# Patient Record
(demographics unavailable — no encounter records)

---

## 2024-10-08 NOTE — HISTORY OF PRESENT ILLNESS
[FreeTextEntry8] : Pt complains of a severe headache starting since yesterday. Pt left work early yesterday due to the pain. 7/10 pain now and 10/10 pain yesterday. Has taken Aleve 3 times since yesterday. Pt believes it is stress related due to her job. Changing her job location. Took off work today. Headache is "all over her head and in the back " Vision is normal. No dizziness. [Fully active, able to carry on all pre-disease performance without restriction] : Status 0 - Fully active, able to carry on all pre-disease performance without restriction [General Appearance - Alert] : alert [General Appearance - In No Acute Distress] : in no acute distress [Sclera] : the sclera and conjunctiva were normal [PERRL With Normal Accommodation] : pupils were equal in size, round, and reactive to light [Extraocular Movements] : extraocular movements were intact [Outer Ear] : the ears and nose were normal in appearance [Oropharynx] : the oropharynx was normal [Neck Appearance] : the appearance of the neck was normal [Neck Cervical Mass (___cm)] : no neck mass was observed [Jugular Venous Distention Increased] : there was no jugular-venous distention [Thyroid Diffuse Enlargement] : the thyroid was not enlarged [Thyroid Nodule] : there were no palpable thyroid nodules [Auscultation Breath Sounds / Voice Sounds] : lungs were clear to auscultation bilaterally [Heart Rate And Rhythm] : heart rate was normal and rhythm regular [Heart Sounds] : normal S1 and S2 [Heart Sounds Gallop] : no gallops [Murmurs] : no murmurs [Heart Sounds Pericardial Friction Rub] : no pericardial rub [Examination Of The Chest] : the chest was normal in appearance [Chest Visual Inspection Thoracic Asymmetry] : no chest asymmetry [Diminished Respiratory Excursion] : normal chest expansion [2+] : left 2+ [Breast Appearance] : normal in appearance [Breast Palpation Mass] : no palpable masses [Bowel Sounds] : normal bowel sounds [Abdomen Soft] : soft [Abdomen Tenderness] : non-tender [Abdomen Mass (___ Cm)] : no abdominal mass palpated [No CVA Tenderness] : no ~M costovertebral angle tenderness [No Spinal Tenderness] : no spinal tenderness [Abnormal Walk] : normal gait [Nail Clubbing] : no clubbing  or cyanosis of the fingernails [Musculoskeletal - Swelling] : no joint swelling seen [Motor Tone] : muscle strength and tone were normal [Skin Color & Pigmentation] : normal skin color and pigmentation [Skin Turgor] : normal skin turgor [] : no rash [Deep Tendon Reflexes (DTR)] : deep tendon reflexes were 2+ and symmetric [Sensation] : the sensory exam was normal to light touch and pinprick [No Focal Deficits] : no focal deficits [Oriented To Time, Place, And Person] : oriented to person, place, and time [Impaired Insight] : insight and judgment were intact [Affect] : the affect was normal [FreeTextEntry1] : Rt axillary lesion is resolving, likely infectious

## 2024-10-08 NOTE — REVIEW OF SYSTEMS
[Headache] : headache [Anxiety] : anxiety [Vision Problems] : no vision problems [Dizziness] : no dizziness

## 2024-10-08 NOTE — HEALTH RISK ASSESSMENT
[No] : No [No falls in past year] : Patient reported no falls in the past year [0] : 2) Feeling down, depressed, or hopeless: Not at all (0) [Never] : Never [PHQ-2 Negative - No further assessment needed] : PHQ-2 Negative - No further assessment needed [[id number]] : 0

## 2024-10-08 NOTE — PHYSICAL EXAM
[No Acute Distress] : no acute distress [Well Nourished] : well nourished [PERRL] : pupils equal round and reactive to light [No Edema] : there was no peripheral edema [Normal] : affect was normal and insight and judgment were intact [de-identified] : cervical and trapezius mm are tight

## 2024-10-14 NOTE — HISTORY OF PRESENT ILLNESS
[FreeTextEntry1] : Complete Physical Examination [de-identified] : 47-year-old  female presents for Complete Physical Examination. Being treated for no chronic medical conditions. Takes no chronic prescription medications. Feels well in general. No new symptoms. Non-smoker. No alcohol intake. Immunizations reviewed and are up to date. Refuses influenza immunization. All preventative services were reviewed in detail and appear to be current for age.

## 2024-10-14 NOTE — HEALTH RISK ASSESSMENT
[Good] : ~his/her~  mood as  good [No] : In the past 12 months have you used drugs other than those required for medical reasons? No [No falls in past year] : Patient reported no falls in the past year [0] : 2) Feeling down, depressed, or hopeless: Not at all (0) [PHQ-2 Negative - No further assessment needed] : PHQ-2 Negative - No further assessment needed [Never] : Never [NO] : No [No Retinopathy] : No retinopathy [Patient reported mammogram was normal] : Patient reported mammogram was normal [HIV test declined] : HIV test declined [Hepatitis C test declined] : Hepatitis C test declined [None] : None [With Family] : lives with family [# of Members in Household ___] :  household currently consist of [unfilled] member(s) [Employed] : employed [College] : College [] :  [# Of Children ___] : has [unfilled] children [Sexually Active] : sexually active [Feels Safe at Home] : Feels safe at home [Fully functional (bathing, dressing, toileting, transferring, walking, feeding)] : Fully functional (bathing, dressing, toileting, transferring, walking, feeding) [Fully functional (using the telephone, shopping, preparing meals, housekeeping, doing laundry, using] : Fully functional and needs no help or supervision to perform IADLs (using the telephone, shopping, preparing meals, housekeeping, doing laundry, using transportation, managing medications and managing finances) [Reports normal functional visual acuity (ie: able to read med bottle)] : Reports normal functional visual acuity [Smoke Detector] : smoke detector [Carbon Monoxide Detector] : carbon monoxide detector [Safety elements used in home] : safety elements used in home [Seat Belt] :  uses seat belt [Sunscreen] : uses sunscreen [Audit-CScore] : 0 [de-identified] : Walking [de-identified] : Balanced [[id number]] : 0 [EyeExamDate] : 06/24 [Change in mental status noted] : No change in mental status noted [Language] : denies difficulty with language [Behavior] : denies difficulty with behavior [Learning/Retaining New Information] : denies difficulty learning/retaining new information [Handling Complex Tasks] : denies difficulty handling complex tasks [Reasoning] : denies difficulty with reasoning [Spatial Ability and Orientation] : denies difficulty with spatial ability and orientation [High Risk Behavior] : no high risk behavior [Reports changes in hearing] : Reports no changes in hearing [Reports changes in vision] : Reports no changes in vision [Reports changes in dental health] : Reports no changes in dental health [Travel to Developing Areas] : does not  travel to developing areas [TB Exposure] : is not being exposed to tuberculosis [MammogramDate] : 03/22 [PapSmearDate] : 11/20 [BoneDensityComments] : NA [ColonoscopyComments] : Not done [de-identified] : Patient, wife and 2 children [FreeTextEntry2] :  [de-identified] : [employer] [FreeTextEntry3] : 2 sons [de-identified] : Glasses-distance [de-identified] : 06/2024

## 2024-10-14 NOTE — HISTORY OF PRESENT ILLNESS
[FreeTextEntry1] : Complete Physical Examination [de-identified] : 47-year-old  female presents for Complete Physical Examination. Being treated for no chronic medical conditions. Takes no chronic prescription medications. Feels well in general. No new symptoms. Non-smoker. No alcohol intake. Immunizations reviewed and are up to date. Refuses influenza immunization. All preventative services were reviewed in detail and appear to be current for age.

## 2024-10-14 NOTE — PHYSICAL EXAM
[No Acute Distress] : no acute distress [Well Nourished] : well nourished [Well Developed] : well developed [Well-Appearing] : well-appearing [Normal Voice/Communication] : normal voice/communication [Normal Sclera/Conjunctiva] : normal sclera/conjunctiva [PERRL] : pupils equal round and reactive to light [EOMI] : extraocular movements intact [Fundoscopic Exam Performed] : fundoscopic ~T exam ~C was performed [Normal Outer Ear/Nose] : the outer ears and nose were normal in appearance [Normal Oropharynx] : the oropharynx was normal [Normal TMs] : both tympanic membranes were normal [Normal Nasal Mucosa] : the nasal mucosa was normal [No JVD] : no jugular venous distention [No Lymphadenopathy] : no lymphadenopathy [Supple] : supple [Thyroid Normal, No Nodules] : the thyroid was normal and there were no nodules present [No Respiratory Distress] : no respiratory distress  [No Accessory Muscle Use] : no accessory muscle use [Clear to Auscultation] : lungs were clear to auscultation bilaterally [Normal Percussion] : the chest was normal to percussion [Normal Rate] : normal rate  [Regular Rhythm] : with a regular rhythm [Normal S1, S2] : normal S1 and S2 [No Murmur] : no murmur heard [No Carotid Bruits] : no carotid bruits [No Abdominal Bruit] : a ~M bruit was not heard ~T in the abdomen [No Varicosities] : no varicosities [Pedal Pulses Present] : the pedal pulses are present [No Edema] : there was no peripheral edema [No Palpable Aorta] : no palpable aorta [No Extremity Clubbing/Cyanosis] : no extremity clubbing/cyanosis [Normal Appearance] : normal in appearance [No Nipple Discharge] : no nipple discharge [No Axillary Lymphadenopathy] : no axillary lymphadenopathy [Soft] : abdomen soft [Non Tender] : non-tender [Non-distended] : non-distended [No Masses] : no abdominal mass palpated [No HSM] : no HSM [Normal Bowel Sounds] : normal bowel sounds [No Hernias] : no hernias [Normal Sphincter Tone] : normal sphincter tone [No Mass] : no mass [Anus Abnormality] : the anus and perineum were normal [Rectal Exam - Rectum] : the rectal exam was normal [Normal Supraclavicular Nodes] : no supraclavicular lymphadenopathy [Normal Axillary Nodes] : no axillary lymphadenopathy [Normal Posterior Cervical Nodes] : no posterior cervical lymphadenopathy [Normal Anterior Cervical Nodes] : no anterior cervical lymphadenopathy [Normal Inguinal Nodes] : no inguinal lymphadenopathy [Normal Femoral Nodes] : no femoral lymphadenopathy [No CVA Tenderness] : no CVA  tenderness [No Spinal Tenderness] : no spinal tenderness [No Joint Swelling] : no joint swelling [Grossly Normal Strength/Tone] : grossly normal strength/tone [No Rash] : no rash [No Skin Lesions] : no skin lesions [Coordination Grossly Intact] : coordination grossly intact [No Focal Deficits] : no focal deficits [Normal Gait] : normal gait [Deep Tendon Reflexes (DTR)] : deep tendon reflexes were 2+ and symmetric [Speech Grossly Normal] : speech grossly normal [Memory Grossly Normal] : memory grossly normal [Normal Affect] : the affect was normal [Alert and Oriented x3] : oriented to person, place, and time [Normal Mood] : the mood was normal [Normal Insight/Judgement] : insight and judgment were intact [Peripheral Vision Was Full To Confrontation Bilaterally] : visual fields were full to confrontation bilaterally [Peripheral Vision Was Full To Confrontation Right Eye] : visual fields were full to confrontation on the right [Peripheral Vision Was Full To Confrontation Left Eye] : visual fields were full to confrontation on the left [Stool Occult Blood] : stool negative for occult blood [Kyphosis] : no kyphosis [Scoliosis] : no scoliosis [Acne] : no acne

## 2024-10-14 NOTE — HEALTH RISK ASSESSMENT
[Good] : ~his/her~  mood as  good [No] : In the past 12 months have you used drugs other than those required for medical reasons? No [No falls in past year] : Patient reported no falls in the past year [0] : 2) Feeling down, depressed, or hopeless: Not at all (0) [PHQ-2 Negative - No further assessment needed] : PHQ-2 Negative - No further assessment needed [Never] : Never [NO] : No [No Retinopathy] : No retinopathy [Patient reported mammogram was normal] : Patient reported mammogram was normal [HIV test declined] : HIV test declined [Hepatitis C test declined] : Hepatitis C test declined [None] : None [With Family] : lives with family [# of Members in Household ___] :  household currently consist of [unfilled] member(s) [Employed] : employed [College] : College [] :  [# Of Children ___] : has [unfilled] children [Sexually Active] : sexually active [Feels Safe at Home] : Feels safe at home [Fully functional (bathing, dressing, toileting, transferring, walking, feeding)] : Fully functional (bathing, dressing, toileting, transferring, walking, feeding) [Fully functional (using the telephone, shopping, preparing meals, housekeeping, doing laundry, using] : Fully functional and needs no help or supervision to perform IADLs (using the telephone, shopping, preparing meals, housekeeping, doing laundry, using transportation, managing medications and managing finances) [Reports normal functional visual acuity (ie: able to read med bottle)] : Reports normal functional visual acuity [Smoke Detector] : smoke detector [Carbon Monoxide Detector] : carbon monoxide detector [Safety elements used in home] : safety elements used in home [Seat Belt] :  uses seat belt [Sunscreen] : uses sunscreen [Audit-CScore] : 0 [de-identified] : Walking [de-identified] : Balanced [[id number]] : 0 [EyeExamDate] : 06/24 [Change in mental status noted] : No change in mental status noted [Language] : denies difficulty with language [Behavior] : denies difficulty with behavior [Learning/Retaining New Information] : denies difficulty learning/retaining new information [Handling Complex Tasks] : denies difficulty handling complex tasks [Reasoning] : denies difficulty with reasoning [Spatial Ability and Orientation] : denies difficulty with spatial ability and orientation [High Risk Behavior] : no high risk behavior [Reports changes in hearing] : Reports no changes in hearing [Reports changes in vision] : Reports no changes in vision [Reports changes in dental health] : Reports no changes in dental health [Travel to Developing Areas] : does not  travel to developing areas [TB Exposure] : is not being exposed to tuberculosis [MammogramDate] : 03/22 [PapSmearDate] : 11/20 [BoneDensityComments] : NA [ColonoscopyComments] : Not done [de-identified] : Patient, wife and 2 children [FreeTextEntry2] :  [de-identified] : [employer] [FreeTextEntry3] : 2 sons [de-identified] : Glasses-distance [de-identified] : 06/2024

## 2024-10-14 NOTE — HISTORY OF PRESENT ILLNESS
[FreeTextEntry1] : Complete Physical Examination [de-identified] : 47-year-old  female presents for Complete Physical Examination. Being treated for no chronic medical conditions. Takes no chronic prescription medications. Feels well in general. No new symptoms. Non-smoker. No alcohol intake. Immunizations reviewed and are up to date. Refuses influenza immunization. All preventative services were reviewed in detail and appear to be current for age.

## 2024-10-14 NOTE — HEALTH RISK ASSESSMENT
[Good] : ~his/her~  mood as  good [No] : In the past 12 months have you used drugs other than those required for medical reasons? No [No falls in past year] : Patient reported no falls in the past year [0] : 2) Feeling down, depressed, or hopeless: Not at all (0) [PHQ-2 Negative - No further assessment needed] : PHQ-2 Negative - No further assessment needed [Never] : Never [NO] : No [No Retinopathy] : No retinopathy [Patient reported mammogram was normal] : Patient reported mammogram was normal [HIV test declined] : HIV test declined [Hepatitis C test declined] : Hepatitis C test declined [None] : None [With Family] : lives with family [# of Members in Household ___] :  household currently consist of [unfilled] member(s) [Employed] : employed [College] : College [] :  [# Of Children ___] : has [unfilled] children [Sexually Active] : sexually active [Feels Safe at Home] : Feels safe at home [Fully functional (bathing, dressing, toileting, transferring, walking, feeding)] : Fully functional (bathing, dressing, toileting, transferring, walking, feeding) [Fully functional (using the telephone, shopping, preparing meals, housekeeping, doing laundry, using] : Fully functional and needs no help or supervision to perform IADLs (using the telephone, shopping, preparing meals, housekeeping, doing laundry, using transportation, managing medications and managing finances) [Reports normal functional visual acuity (ie: able to read med bottle)] : Reports normal functional visual acuity [Smoke Detector] : smoke detector [Carbon Monoxide Detector] : carbon monoxide detector [Safety elements used in home] : safety elements used in home [Seat Belt] :  uses seat belt [Sunscreen] : uses sunscreen [Audit-CScore] : 0 [de-identified] : Walking [de-identified] : Balanced [[id number]] : 0 [EyeExamDate] : 06/24 [Change in mental status noted] : No change in mental status noted [Language] : denies difficulty with language [Behavior] : denies difficulty with behavior [Learning/Retaining New Information] : denies difficulty learning/retaining new information [Handling Complex Tasks] : denies difficulty handling complex tasks [Reasoning] : denies difficulty with reasoning [Spatial Ability and Orientation] : denies difficulty with spatial ability and orientation [High Risk Behavior] : no high risk behavior [Reports changes in hearing] : Reports no changes in hearing [Reports changes in vision] : Reports no changes in vision [Reports changes in dental health] : Reports no changes in dental health [Travel to Developing Areas] : does not  travel to developing areas [TB Exposure] : is not being exposed to tuberculosis [MammogramDate] : 03/22 [PapSmearDate] : 11/20 [BoneDensityComments] : NA [ColonoscopyComments] : Not done [de-identified] : Patient, wife and 2 children [FreeTextEntry2] :  [de-identified] : [employer] [FreeTextEntry3] : 2 sons [de-identified] : Glasses-distance [de-identified] : 06/2024

## 2024-10-14 NOTE — HISTORY OF PRESENT ILLNESS
[FreeTextEntry1] : Complete Physical Examination [de-identified] : 47-year-old  female presents for Complete Physical Examination. Being treated for no chronic medical conditions. Takes no chronic prescription medications. Feels well in general. No new symptoms. Non-smoker. No alcohol intake. Immunizations reviewed and are up to date. Refuses influenza immunization. All preventative services were reviewed in detail and appear to be current for age.

## 2024-10-14 NOTE — HEALTH RISK ASSESSMENT
[Good] : ~his/her~  mood as  good [No] : In the past 12 months have you used drugs other than those required for medical reasons? No [No falls in past year] : Patient reported no falls in the past year [0] : 2) Feeling down, depressed, or hopeless: Not at all (0) [PHQ-2 Negative - No further assessment needed] : PHQ-2 Negative - No further assessment needed [Never] : Never [NO] : No [No Retinopathy] : No retinopathy [Patient reported mammogram was normal] : Patient reported mammogram was normal [HIV test declined] : HIV test declined [Hepatitis C test declined] : Hepatitis C test declined [None] : None [With Family] : lives with family [# of Members in Household ___] :  household currently consist of [unfilled] member(s) [Employed] : employed [College] : College [] :  [# Of Children ___] : has [unfilled] children [Sexually Active] : sexually active [Feels Safe at Home] : Feels safe at home [Fully functional (bathing, dressing, toileting, transferring, walking, feeding)] : Fully functional (bathing, dressing, toileting, transferring, walking, feeding) [Fully functional (using the telephone, shopping, preparing meals, housekeeping, doing laundry, using] : Fully functional and needs no help or supervision to perform IADLs (using the telephone, shopping, preparing meals, housekeeping, doing laundry, using transportation, managing medications and managing finances) [Reports normal functional visual acuity (ie: able to read med bottle)] : Reports normal functional visual acuity [Smoke Detector] : smoke detector [Carbon Monoxide Detector] : carbon monoxide detector [Safety elements used in home] : safety elements used in home [Seat Belt] :  uses seat belt [Sunscreen] : uses sunscreen [Audit-CScore] : 0 [de-identified] : Walking [de-identified] : Balanced [[id number]] : 0 [EyeExamDate] : 06/24 [Change in mental status noted] : No change in mental status noted [Language] : denies difficulty with language [Behavior] : denies difficulty with behavior [Learning/Retaining New Information] : denies difficulty learning/retaining new information [Handling Complex Tasks] : denies difficulty handling complex tasks [Reasoning] : denies difficulty with reasoning [Spatial Ability and Orientation] : denies difficulty with spatial ability and orientation [High Risk Behavior] : no high risk behavior [Reports changes in hearing] : Reports no changes in hearing [Reports changes in vision] : Reports no changes in vision [Reports changes in dental health] : Reports no changes in dental health [Travel to Developing Areas] : does not  travel to developing areas [TB Exposure] : is not being exposed to tuberculosis [MammogramDate] : 03/22 [PapSmearDate] : 11/20 [BoneDensityComments] : NA [ColonoscopyComments] : Not done [de-identified] : Patient, wife and 2 children [FreeTextEntry2] :  [de-identified] : [employer] [FreeTextEntry3] : 2 sons [de-identified] : Glasses-distance [de-identified] : 06/2024

## 2025-01-22 NOTE — HISTORY OF PRESENT ILLNESS
[FreeTextEntry8] : 47 year female came for sick visit. Pt is seen first time during coverage PCP Pt is c/o cold sx,for 2 days mild fever, myalgia, sore throat and cough. Currently pt is not in acute distress, denies fever, headache, chest pain, palpitations, shortness of breath.

## 2025-01-30 NOTE — PHYSICAL EXAM
[Chaperone Present] : A chaperone was present in the examining room during all aspects of the physical examination [FreeTextEntry2] : Hilda [Appropriately responsive] : appropriately responsive [Alert] : alert [No Acute Distress] : no acute distress [No Lymphadenopathy] : no lymphadenopathy [Regular Rate Rhythm] : regular rate rhythm [No Murmurs] : no murmurs [Clear to Auscultation B/L] : clear to auscultation bilaterally [Soft] : soft [Non-tender] : non-tender [Non-distended] : non-distended [No HSM] : No HSM [No Lesions] : no lesions [No Mass] : no mass [Oriented x3] : oriented x3 [Examination Of The Breasts] : a normal appearance [Breast Palpation Diffuse Fibrous Tissue Bilateral] : fibrocystic changes [No Masses] : no breast masses were palpable [Labia Majora] : normal [Labia Minora] : normal [Normal] : normal [Uterine Adnexae] : normal

## 2025-01-30 NOTE — PLAN
[FreeTextEntry1] : Yeast infection info reviewed.  Different treatment options discussed, including use of Boric Acid. Will use Diflucan with next menses. Discussed heavy periods, can try Ibuprofen regimen. Reviewed SBE RTO 1yr, sooner prn

## 2025-01-30 NOTE — HISTORY OF PRESENT ILLNESS
[Patient reported PAP Smear was normal] : Patient reported PAP Smear was normal [FreeTextEntry1] : 48yo P2, LMP 1/14/25, here for routine gyn exam and pap. Feels well overall. Though some concern over vulvovaginal itching that only occurs around her period. Diagnosed with yeast in the past. Has completed prescribed treatment only to have it occur again next cycle.  Gyn Hx: 14-14yo x regular x 7d, heavy 4 days, some cramping.  No h/o abnormal paps. Last pap was 2020. In monogamous relationship with . No concerns. BC: condoms  Mammogram and breast sono done 1/15/25, report reviewed.  No suspicious solid mass.  Multiple cysts and cyst clusters ranging in size up to 9 mm.  Left breast: Retro areolar 2:00 location redemonstrated previously biopsied hypoechoic mass, stable appearing.  Ob Hx: C/section x2, 15yo, 15yo boys doing well.  Hx herniated disc [Mammogramdate] : 1/15/25 [TextBox_19] : BIRADS 2- benign [BreastSonogramDate] : 1/15/25 [PapSmeardate] : 2020

## 2025-02-04 NOTE — PHYSICAL EXAM
[No Acute Distress] : no acute distress [Well Nourished] : well nourished [Well Developed] : well developed [Normal Voice/Communication] : normal voice/communication [Ill-Appearing] : ill-appearing [Normal Sclera/Conjunctiva] : normal sclera/conjunctiva [PERRL] : pupils equal round and reactive to light [EOMI] : extraocular movements intact [Normal Outer Ear/Nose] : the outer ears and nose were normal in appearance [Normal TMs] : both tympanic membranes were normal [No JVD] : no jugular venous distention [No Lymphadenopathy] : no lymphadenopathy [Supple] : supple [No Respiratory Distress] : no respiratory distress  [Clear to Auscultation] : lungs were clear to auscultation bilaterally [Normal Rate] : normal rate  [Regular Rhythm] : with a regular rhythm [Normal S1, S2] : normal S1 and S2 [No Murmur] : no murmur heard [Soft] : abdomen soft [Non Tender] : non-tender [No HSM] : no HSM [Normal Bowel Sounds] : normal bowel sounds [Normal Axillary Nodes] : no axillary lymphadenopathy [Normal Posterior Cervical Nodes] : no posterior cervical lymphadenopathy [Normal Anterior Cervical Nodes] : no anterior cervical lymphadenopathy [Normal Inguinal Nodes] : no inguinal lymphadenopathy [No CVA Tenderness] : no CVA  tenderness [No Joint Swelling] : no joint swelling [No Rash] : no rash [de-identified] : 1-2+ posterior pharynx inflammation without exudate or petechiae, 4 mm shallow ulcer noted under right tongue with red border-no exudate

## 2025-02-04 NOTE — HEALTH RISK ASSESSMENT
[0] : 2) Feeling down, depressed, or hopeless: Not at all (0) [PHQ-2 Negative - No further assessment needed] : PHQ-2 Negative - No further assessment needed [Never] : Never [[id number]] : 0

## 2025-02-04 NOTE — HEALTH RISK ASSESSMENT
[0] : 2) Feeling down, depressed, or hopeless: Not at all (0) [PHQ-2 Negative - No further assessment needed] : PHQ-2 Negative - No further assessment needed [Never] : Never [[id number]] : 0

## 2025-02-04 NOTE — HISTORY OF PRESENT ILLNESS
[FreeTextEntry8] : 47-year-old female presents with history of sore throat x 2-1/2 weeks. In addition, reports mild cough and nasal congestion, but sore throat is the main symptom. Denies fever, chills, weight loss, or rash. Had been seen on 01/22/2025 and underwent negative office rapid strep Cepheid test. Results were reported as negative. In addition, has developed sore under right tongue over the last week.

## 2025-02-04 NOTE — REVIEW OF SYSTEMS
[Fatigue] : fatigue [Nasal Discharge] : nasal discharge [Postnasal Drip] : postnasal drip [Cough] : cough [Negative] : Heme/Lymph [Fever] : no fever [Chills] : no chills [Night Sweats] : no night sweats [Earache] : no earache [Hearing Loss] : no hearing loss [Sore Throat] : no sore throat [Chest Pain] : no chest pain [Palpitations] : no palpitations [Shortness Of Breath] : no shortness of breath [Wheezing] : no wheezing [Dyspnea on Exertion] : no dyspnea on exertion [Abdominal Pain] : no abdominal pain [Nausea] : no nausea [Diarrhea] : diarrhea [Vomiting] : no vomiting [Hematuria] : no hematuria [Joint Pain] : no joint pain [Joint Swelling] : no joint swelling [Skin Rash] : no skin rash [Swollen Glands] : no swollen glands

## 2025-02-04 NOTE — PHYSICAL EXAM
[No Acute Distress] : no acute distress [Well Nourished] : well nourished [Well Developed] : well developed [Normal Voice/Communication] : normal voice/communication [Ill-Appearing] : ill-appearing [Normal Sclera/Conjunctiva] : normal sclera/conjunctiva [PERRL] : pupils equal round and reactive to light [EOMI] : extraocular movements intact [Normal Outer Ear/Nose] : the outer ears and nose were normal in appearance [Normal TMs] : both tympanic membranes were normal [No JVD] : no jugular venous distention [No Lymphadenopathy] : no lymphadenopathy [Supple] : supple [No Respiratory Distress] : no respiratory distress  [Clear to Auscultation] : lungs were clear to auscultation bilaterally [Normal Rate] : normal rate  [Regular Rhythm] : with a regular rhythm [Normal S1, S2] : normal S1 and S2 [No Murmur] : no murmur heard [Soft] : abdomen soft [Non Tender] : non-tender [No HSM] : no HSM [Normal Bowel Sounds] : normal bowel sounds [Normal Axillary Nodes] : no axillary lymphadenopathy [Normal Posterior Cervical Nodes] : no posterior cervical lymphadenopathy [Normal Anterior Cervical Nodes] : no anterior cervical lymphadenopathy [Normal Inguinal Nodes] : no inguinal lymphadenopathy [No CVA Tenderness] : no CVA  tenderness [No Joint Swelling] : no joint swelling [No Rash] : no rash [de-identified] : 1-2+ posterior pharynx inflammation without exudate or petechiae, 4 mm shallow ulcer noted under right tongue with red border-no exudate

## 2025-02-25 NOTE — HISTORY OF PRESENT ILLNESS
[de-identified] : 02/25/2025: Patient is a 47-year-old female presenting for evaluation of right foot/ankle pain that is been worsening over the last month without any injury.  She has some mild pain at rest which is significantly worse in the morning.  She has pain with walking and typically cannot walk barefoot without any pain.  She has tried Epson salt, ice pack, cream, NSAID, previous plantar fascia corticosteroid injection a year ago without much improvement.  She complains of ankle weakness.  Intermittent numbness and tingling and swelling.  She does have history of ankle sprains in the past

## 2025-02-25 NOTE — ASSESSMENT
[FreeTextEntry1] : Right ankle/foot pain that has been present over a year recently worsening without injury with exam consistent with pes planovalgus, posterior tibialis tendinitis, plantar fasciitis, sinus Tarsi syndrome.  History of plantar fascia corticosteroid injection a year ago without improvement of symptoms.  X-rays reviewed showing degenerative changes and possible syndesmosis widening however could be due to x-ray technique - Physical therapy recommended - Discussed shoewear in and out of the house - Discussed the importance of weight loss - Can consider biotics in the future if needed - Okay to use Tylenol, NSAID, ice as needed for pain - If not improved, consider MRI of the right ankle

## 2025-02-25 NOTE — PHYSICAL EXAM
[de-identified] : Constitutional: Well developed, well nourished, able to communicate Neuro: Normal sensation, No focal deficits Skin: Intact CV: Peripheral vascular exam grossly normal Pulm: No signs of respiratory distress Psych: Oriented, normal mood and affect  L ankle/foot: -Pes planovalgus deformity - No pain with palpation of achilles, medial or lateral malleoli, base of 5th metatarsal, navicular, metatarsals, or digits - ROM intact throughout ankle dorsiflexion, plantarflexion, inversion, eversion. Toes with normal flexion and extension. - 5/5 strength throughout  - Distally neurovascularly intact   Rankle/foot: -Pes planovalgus deformity - Pain with palpation over the PTT posterior to the medial malleolus - Mild pain to the sinus Tarsi - Mild pain to the origin of the plantar fascia - No pain with palpation of achilles, medial or lateral malleoli, base of 5th metatarsal, navicular, metatarsals, or digits - ROM intact throughout ankle dorsiflexion, plantarflexion, inversion, eversion. toes with normal flexion and extension. - 5/5 strength throughout - Negative anterior drawer, talar tilt, ankle squeeze test - Negative calcaneal squeeze - Mild pain with metatarsal squeeze - Distally neurovascularly intact [de-identified] : 3 views of the right foot showing bipartite tibial sesamoid, peroneal os, midfoot degenerative changes, os trigonum, Achilles spurring, calcaneal plantar spur without fracture or dislocation  3 views of the right ankle without fracture, dislocation, or significant degenerative change.  Question widening of the tib-fib overlap

## 2025-04-01 NOTE — ASSESSMENT
[FreeTextEntry1] : Right ankle/foot pain that has been present over a year recently worsening without injury with exam consistent with pes planovalgus, posterior tibialis tendinitis, plantar fasciitis, sinus Tarsi syndrome.  History of plantar fascia corticosteroid injection a year ago without improvement of symptoms.  X-rays reviewed showing degenerative changes and possible syndesmosis widening however could be due to x-ray technique. Poor PT place - Physical therapy recommended -> new referral given and new locations discussed - Discussed shoewear in and out of the house - Discussed the importance of weight loss - Can consider orthotics in the future if needed - Okay to use Tylenol, NSAID, ice as needed for pain - If not improved, consider MRI of the right ankle. Follow up in 6 weeks

## 2025-04-01 NOTE — PHYSICAL EXAM
[de-identified] : Constitutional: Well developed, well nourished, able to communicate Neuro: Normal sensation, No focal deficits Skin: Intact CV: Peripheral vascular exam grossly normal Pulm: No signs of respiratory distress Psych: Oriented, normal mood and affect  L ankle/foot: -Pes planovalgus deformity - No pain with palpation of achilles, medial or lateral malleoli, base of 5th metatarsal, navicular, metatarsals, or digits - ROM intact throughout ankle dorsiflexion, plantarflexion, inversion, eversion. Toes with normal flexion and extension. - 5/5 strength throughout  - Distally neurovascularly intact   Rankle/foot: -Pes planovalgus deformity - Pain with palpation over the PTT posterior to the medial malleolus - Mild pain to the sinus Tarsi - Mild pain to the origin of the plantar fascia - No pain with palpation of achilles, medial or lateral malleoli, base of 5th metatarsal, navicular, metatarsals, or digits - ROM intact throughout ankle dorsiflexion, plantarflexion, inversion, eversion. toes with normal flexion and extension. - 5/5 strength throughout - Negative anterior drawer, talar tilt, ankle squeeze test - Negative calcaneal squeeze - Mild pain with metatarsal squeeze - Distally neurovascularly intact [de-identified] : 3 views of the right foot showing bipartite tibial sesamoid, peroneal os, midfoot degenerative changes, os trigonum, Achilles spurring, calcaneal plantar spur without fracture or dislocation  3 views of the right ankle without fracture, dislocation, or significant degenerative change.  Question widening of the tib-fib overlap

## 2025-04-01 NOTE — HISTORY OF PRESENT ILLNESS
[de-identified] : 04/01/2025: Presenting for follow up of R foot/ankle pain. Since last visit, she went to PT at [facility] and has not been happy with treatment - only pain modalities, no exercises of hands on work. She says pain is similar to previous visit. She tried oofos shoes without improvement. No new injuries.    02/25/2025: Patient is a 47-year-old female presenting for evaluation of right foot/ankle pain that is been worsening over the last month without any injury.  She has some mild pain at rest which is significantly worse in the morning.  She has pain with walking and typically cannot walk barefoot without any pain.  She has tried Epson salt, ice pack, cream, NSAID, previous plantar fascia corticosteroid injection a year ago without much improvement.  She complains of ankle weakness.  Intermittent numbness and tingling and swelling.  She does have history of ankle sprains in the past

## 2025-05-22 NOTE — ASSESSMENT
[FreeTextEntry1] : Right ankle/foot pain that has been present over a year without injury with exam consistent with pes planovalgus, posterior tibialis tendinitis, plantar fasciitis, sinus Tarsi syndrome.  History of plantar fascia corticosteroid injection a year ago without improvement of symptoms.  X-rays reviewed showing degenerative changes and possible syndesmosis widening however could be due to x-ray technique. has been in PT for over 6 weeks without much improvement - MRI R ankle - Continue PT  - Discussed shoewear in and out of the house - Discussed the importance of weight loss - Can consider orthotics in the future if needed - Okay to use Tylenol, NSAID, ice as needed for pain  - Follow up after MRI

## 2025-05-22 NOTE — HISTORY OF PRESENT ILLNESS
[de-identified] : 05/22/2025: Presenting for follow up of R ankle/foot pain. Since last visit, [facility] in [place] with mild improvement in her pain symptoms.  Using NSAID as needed. Still with intermittent pain with walking.   04/01/2025: Presenting for follow up of R foot/ankle pain. Since last visit, she went to PT at [facility] and has not been happy with treatment - only pain modalities, no exercises of hands on work. She says pain is similar to previous visit. She tried oofos shoes without improvement. No new injuries.    02/25/2025: Patient is a 47-year-old female presenting for evaluation of right foot/ankle pain that is been worsening over the last month without any injury.  She has some mild pain at rest which is significantly worse in the morning.  She has pain with walking and typically cannot walk barefoot without any pain.  She has tried Epson salt, ice pack, cream, NSAID, previous plantar fascia corticosteroid injection a year ago without much improvement.  She complains of ankle weakness.  Intermittent numbness and tingling and swelling.  She does have history of ankle sprains in the past

## 2025-05-22 NOTE — PHYSICAL EXAM
[de-identified] : Constitutional: Well developed, well nourished, able to communicate Neuro: Normal sensation, No focal deficits Skin: Intact CV: Peripheral vascular exam grossly normal Pulm: No signs of respiratory distress Psych: Oriented, normal mood and affect  L ankle/foot: -Pes planovalgus deformity - No pain with palpation of achilles, medial or lateral malleoli, base of 5th metatarsal, navicular, metatarsals, or digits - ROM intact throughout ankle dorsiflexion, plantarflexion, inversion, eversion. Toes with normal flexion and extension. - 5/5 strength throughout  - Distally neurovascularly intact   Rankle/foot: -Pes planovalgus deformity - Pain with palpation over the PTT posterior to the medial malleolus - Mild pain to the sinus Tarsi - Mild pain to the origin of the plantar fascia - No pain with palpation of achilles, medial or lateral malleoli, base of 5th metatarsal, navicular, metatarsals, or digits - ROM intact throughout ankle dorsiflexion, plantarflexion, inversion, eversion. toes with normal flexion and extension. - 5/5 strength throughout - Negative anterior drawer, talar tilt, ankle squeeze test - Negative calcaneal squeeze - Mild pain with metatarsal squeeze - Distally neurovascularly intact [de-identified] : 3 views of the right foot showing bipartite tibial sesamoid, peroneal os, midfoot degenerative changes, os trigonum, Achilles spurring, calcaneal plantar spur without fracture or dislocation  3 views of the right ankle without fracture, dislocation, or significant degenerative change.  Question widening of the tib-fib overlap

## 2025-06-11 NOTE — ASSESSMENT
[FreeTextEntry1] : Right ankle/foot pain that has been present over a year without injury with exam consistent with pes planovalgus, posterior tibialis tendinitis, plantar fasciitis, sinus Tarsi syndrome.  History of plantar fascia corticosteroid injection a year ago without improvement of symptoms.  X-rays reviewed showing degenerative changes and possible syndesmosis widening however could be due to x-ray technique. has been in PT for over 6 weeks without much improvement. MRI showing mild to moderate tenosynovitis of Posterior tib, flexor dig, sinus tarsi bursal fluid collection.  - Discussed escalating to camboot immobilization +/- CSI vs PRP. She will think on this. Going on a trip out of the country soon - Continue PT  - Discussed shoewear in and out of the house - Discussed the importance of weight loss - Can consider orthotics in the future if needed - Okay to use Tylenol, NSAID, ice as needed for pain  - Follow up after trip

## 2025-06-11 NOTE — HISTORY OF PRESENT ILLNESS
[de-identified] : 06/11/2025: States ongoing pain, but improved with use of hokas. Doing PT and [facility]P without much noted benefit. Completed MRI  05/22/2025: Presenting for follow up of R ankle/foot pain. Since last visit, [facility] in [place] with mild improvement in her pain symptoms.  Using NSAID as needed. Still with intermittent pain with walking.   04/01/2025: Presenting for follow up of R foot/ankle pain. Since last visit, she went to PT at [facility] and has not been happy with treatment - only pain modalities, no exercises of hands on work. She says pain is similar to previous visit. She tried oofos shoes without improvement. No new injuries.    02/25/2025: Patient is a 47-year-old female presenting for evaluation of right foot/ankle pain that is been worsening over the last month without any injury.  She has some mild pain at rest which is significantly worse in the morning.  She has pain with walking and typically cannot walk barefoot without any pain.  She has tried Epson salt, ice pack, cream, NSAID, previous plantar fascia corticosteroid injection a year ago without much improvement.  She complains of ankle weakness.  Intermittent numbness and tingling and swelling.  She does have history of ankle sprains in the past

## 2025-06-11 NOTE — PHYSICAL EXAM
[de-identified] : Constitutional: Well developed, well nourished, able to communicate Neuro: Normal sensation, No focal deficits Skin: Intact CV: Peripheral vascular exam grossly normal Pulm: No signs of respiratory distress Psych: Oriented, normal mood and affect  L ankle/foot: -Pes planovalgus deformity - No pain with palpation of achilles, medial or lateral malleoli, base of 5th metatarsal, navicular, metatarsals, or digits - ROM intact throughout ankle dorsiflexion, plantarflexion, inversion, eversion. Toes with normal flexion and extension. - 5/5 strength throughout - Distally neurovascularly intact   Rankle/foot: -Pes planovalgus deformity - Pain with palpation over the PTT posterior to the medial malleolus - Mild pain to the sinus Tarsi - Mild pain to the origin of the plantar fascia - No pain with palpation of achilles, medial or lateral malleoli, base of 5th metatarsal, navicular, metatarsals, or digits - ROM intact throughout ankle dorsiflexion, plantarflexion, inversion, eversion. toes with normal flexion and extension. - 5/5 strength throughout - Negative anterior drawer, talar tilt, ankle squeeze test - Negative calcaneal squeeze - Mild pain with metatarsal squeeze - Distally neurovascularly intact [de-identified] : 3 views of the right foot showing bipartite tibial sesamoid, peroneal os, midfoot degenerative changes, os trigonum, Achilles spurring, calcaneal plantar spur without fracture or dislocation  3 views of the right ankle without fracture, dislocation, or significant degenerative change.  Question widening of the tib-fib overlap  EXAM:  MRI RIGHT ANKLE WITHOUT CONTRAST HISTORY: Tibialis posterior tendinopathy, plantar fasciitis, sinus tarsi syndrome. TECHNIQUE:  Multiplanar, multi-sequence MRI of the right hindfoot/ankle was obtained on a 3T scanner according to standard protocol without intravenous or intra-articular contrast. COMPARISON:  None available. FINDINGS:   Osseous structures: No fracture or osteonecrosis. No tarsal coalition. Nonedematous os trigonum. Joints: Tibiotalar and subtalar joints: No osteochondral injury, cartilage loss, subchondral edema-like marrow signal, or osteophytes. Small posterior subtalar joint effusion. Remaining tarsal and tarsometatarsal joints: No osteochondral injury, cartilage loss, subchondral edema-like marrow signal, or osteophytes. No effusions. Tendons: No tibialis posterior tendinosis or tendon tear. Small amount of fluid about the tibialis posterior tendon; query tenosynovitis. Small amount of fluid about the flexor digitorum longus tendon; query tenosynovitis. Otherwise normal flexor digitorum longus tendon. Normal flexor hallucis longus tendon. C-shaped configuration of the retromalleolar fibers of the peroneus brevis tendon without a complete longitudinal tear. Nonedematous os peroneum. No peroneus longus tendinosis or tendon tear. Trace amounts of fluid about the peroneal tendons, nonspecific. Normal extensor tendons. Ligaments: Intact syndesmotic ligaments. Intact anterior talofibular ligament. Intact calcaneofibular ligament. Intact posterior talofibular ligament. Intact deltoid ligamentous complex. Mildly increased signal about the superomedial fibers of the spring ligament, which may reflect degeneration or a lowgrade sprain. Sinus tarsi: 0.7 x 1.2 x 0.4 cm area of proton-density signal hyperintensity lying deep to the extensor digitorum longus tendons and lateral to the talar head and neck, extending in proximity to the anterosuperior aspect of the sinus tarsi in the expected location of the Gruberi bursa; query bursitis. Achilles tendon: Minimal retrocalcaneal enthesopathy. Otherwise normal Achilles tendon without tendinosis, tear, or peritendinitis. No retrocalcaneal or retro-Achillies bursitis. Plantar fascia: Plantar calcaneal enthesophyte. Mild heel pad edema, nonspecific. Otherwise normal plantar fascia without plantar fasciopathy, tear, or plantar fibroma. Muscles and nerves: Normal included muscles of the foot and ankle without edema or atrophy. No mass or cyst in the tarsal tunnel. Soft tissues: No visualized soft tissue mass. Areas of mild subcutaneous edema about the ankle. IMPRESSION: MRI of the right ankle demonstrates: 1. No tibialis posterior tendinosis or tendon tear. 2. Small amounts of fluid about the tibialis posterior and flexor digitorum longus tendons; query tenosynovitis. 3. C-shaped configuration of the retromalleolar fibers of the peroneus brevis tendon without a complete longitudinal tear. 4. Mildly increased signal about the superomedial fibers of the spring ligament, which may reflect degeneration or a lowgrade sprain. 5. 0.7 x 1.2 x 0.4 cm area of proton-density signal hyperintensity lying deep to the extensor digitorum longus tendons and lateral to the talar head and neck, extending in proximity to the anterosuperior aspect of the sinus tarsi in the expected location of the Gruberi bursa; query bursitis. 6. Plantar calcaneal enthesophyte. Mild heel pad edema, nonspecific. Otherwise normal plantar fascia. 7. Small posterior subtalar joint effusion.